# Patient Record
Sex: FEMALE
[De-identification: names, ages, dates, MRNs, and addresses within clinical notes are randomized per-mention and may not be internally consistent; named-entity substitution may affect disease eponyms.]

---

## 2021-05-18 ENCOUNTER — APPOINTMENT (OUTPATIENT)
Dept: DISASTER EMERGENCY | Facility: OTHER | Age: 76
End: 2021-05-18

## 2021-06-18 ENCOUNTER — APPOINTMENT (OUTPATIENT)
Dept: ORTHOPEDIC SURGERY | Facility: CLINIC | Age: 76
End: 2021-06-18

## 2021-07-07 ENCOUNTER — APPOINTMENT (OUTPATIENT)
Dept: ORTHOPEDIC SURGERY | Facility: CLINIC | Age: 76
End: 2021-07-07
Payer: MEDICAID

## 2021-07-07 VITALS — SYSTOLIC BLOOD PRESSURE: 148 MMHG | DIASTOLIC BLOOD PRESSURE: 76 MMHG | HEART RATE: 56 BPM

## 2021-07-07 PROCEDURE — 73030 X-RAY EXAM OF SHOULDER: CPT | Mod: 26,RT

## 2021-07-07 PROCEDURE — 99203 OFFICE O/P NEW LOW 30 MIN: CPT

## 2021-07-07 RX ORDER — LOSARTAN POTASSIUM 100 MG/1
TABLET, FILM COATED ORAL
Refills: 0 | Status: ACTIVE | COMMUNITY

## 2021-07-07 RX ORDER — FUROSEMIDE 20 MG/1
20 TABLET ORAL
Refills: 0 | Status: ACTIVE | COMMUNITY

## 2021-07-07 NOTE — DISCUSSION/SUMMARY
[de-identified] : 75-year-old female with chronic right shoulder pain. She's had numerous cortisone injections in the past, physical therapy, NSAIDs.  We'll obtain an MRI to further evaluate. Follow up after MRI

## 2021-07-07 NOTE — PHYSICAL EXAM
[de-identified] : General Exam\par \par Well developed, well nourished\par No apparent distress\par Oriented to person, place, and time\par Mood: Normal\par Affect: Normal\par Balance and coordination: Normal\par Gait: Normal\par \par Right shoulder exam\par \par Inspection: No swelling, ecchymosis or gross deformity.\par Skin: No masses, No lesions\par Tenderness: No bicipital tenderness, no tenderness to the greater tuberosity/RTC insertion, no anterior shoulder/lesser tuberosity tenderness. No tenderness SC joint, clavicle, AC joint.\par ROM: 160/60/T6\par Impingement tests: Positive Quinones\par AC Joint: no pain with cross arm testing\par Biceps: Negative speed\par Strength: 4/5 abduction, external rotation, and internal rotation\par Neuro: AIN, PIN, Ulnar nerve motor intact\par Sensation: Intact to light touch in radial, median, ulnar, and axillary nerve distributions\par Vasc: 2+ radial pulse\par  [de-identified] : 3 views R shoulder obtained.  Mild degenerative changes glenohumeral joint and a.c. joint. No acute fracture/dislocation

## 2021-07-07 NOTE — HISTORY OF PRESENT ILLNESS
[de-identified] : 75 year-old female presenting with Right shoulder pain for over 1 year. She reports pain is located diffusely over the shoulder with occasional radiation down the arm with associated weakness. Patient reports difficulty laying on the right side due to pain. Denies any numbness or tingling. Patient was referred to PT by PCP which she has done for several months without improvement in symptoms. She also reports having 3 injections into the shoulder over the past 9 months with relief in symptoms for up to 1 month. She has also oral and topical NSAIDs with minimal improvement. Denies any injuries to the shoulder. \par \par The patient's past medical history, past surgical history, medications, allergies, and social history were reviewed by me today with the patient and documented accordingly. In addition, the patient's family history, which is noncontributory to this visit, was also reviewed.\par

## 2021-07-21 ENCOUNTER — NON-APPOINTMENT (OUTPATIENT)
Age: 76
End: 2021-07-21

## 2021-07-28 ENCOUNTER — APPOINTMENT (OUTPATIENT)
Dept: ORTHOPEDIC SURGERY | Facility: CLINIC | Age: 76
End: 2021-07-28
Payer: MEDICAID

## 2021-07-28 DIAGNOSIS — G89.29 PAIN IN RIGHT SHOULDER: ICD-10-CM

## 2021-07-28 DIAGNOSIS — M25.511 PAIN IN RIGHT SHOULDER: ICD-10-CM

## 2021-07-28 PROCEDURE — 99213 OFFICE O/P EST LOW 20 MIN: CPT

## 2021-07-28 NOTE — PHYSICAL EXAM
[de-identified] : Right shoulder exam\par \par Inspection: No swelling, ecchymosis or gross deformity.\par Skin: No masses, No lesions\par Tenderness: No bicipital tenderness, no tenderness to the greater tuberosity/RTC insertion, no anterior shoulder/lesser tuberosity tenderness. No tenderness SC joint, clavicle, AC joint.\par ROM: 160/60/T6\par Impingement tests: Positive Quinones\par AC Joint: no pain with cross arm testing\par Biceps: Negative speed\par Strength: 4/5 abduction, external rotation, and internal rotation\par Neuro: AIN, PIN, Ulnar nerve motor intact\par Sensation: Intact to light touch in radial, median, ulnar, and axillary nerve distributions\par Vasc: 2+ radial pulse [de-identified] : radiographs and MRI of the right shoulder obtained previously were reviewed. 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 Rupture of the long head of the biceps

## 2021-07-28 NOTE — DISCUSSION/SUMMARY
[de-identified] : 75-year-old female right shoulder rotator cuff tear. We discussed treatment options at length both surgical and nonsurgical status translation. She reports that she is not interested in surgical management. She is given a prescription for physical therapy is given information on rotator cuff tears in [place] for her to read she may follow up as needed and if not improved we'll again discuss shoulder arthroscopy

## 2021-07-28 NOTE — HISTORY OF PRESENT ILLNESS
[de-identified] : 75 year-old female presenting with Right shoulder pain for over 1 year. She reports pain is located diffusely over the shoulder with occasional radiation down the arm with associated weakness. Patient reports difficulty laying on the right side due to pain. Denies any numbness or tingling. Patient was referred to PT by PCP which she has done for several months without improvement in symptoms. She also reports having 3 injections into the shoulder over the past 9 months with relief in symptoms for up to 1 month. She has also oral and topical NSAIDs with minimal improvement. Denies any injuries to the shoulder. \par A MRI was done since last visit, here to review results today.\par